# Patient Record
Sex: FEMALE | URBAN - METROPOLITAN AREA
[De-identification: names, ages, dates, MRNs, and addresses within clinical notes are randomized per-mention and may not be internally consistent; named-entity substitution may affect disease eponyms.]

---

## 2017-12-25 ENCOUNTER — HOSPITAL ENCOUNTER (EMERGENCY)
Facility: HOSPITAL | Age: 82
End: 2017-12-25
Attending: EMERGENCY MEDICINE

## 2017-12-25 VITALS
RESPIRATION RATE: 28 BRPM | OXYGEN SATURATION: 97 % | TEMPERATURE: 97 F | DIASTOLIC BLOOD PRESSURE: 56 MMHG | HEART RATE: 67 BPM | SYSTOLIC BLOOD PRESSURE: 147 MMHG

## 2017-12-25 PROCEDURE — 93005 ELECTROCARDIOGRAM TRACING: CPT

## 2017-12-25 PROCEDURE — 93010 ELECTROCARDIOGRAM REPORT: CPT | Performed by: EMERGENCY MEDICINE

## 2017-12-25 RX ORDER — AMLODIPINE BESYLATE 5 MG/1
5 TABLET ORAL DAILY
COMMUNITY
End: 2017-12-25

## 2017-12-25 RX ORDER — ASPIRIN 325 MG
325 TABLET, DELAYED RELEASE (ENTERIC COATED) ORAL EVERY 6 HOURS PRN
COMMUNITY
End: 2017-12-25

## 2017-12-25 RX ORDER — METOPROLOL SUCCINATE 50 MG/1
50 TABLET, EXTENDED RELEASE ORAL DAILY
COMMUNITY
End: 2017-12-25

## 2017-12-25 RX ORDER — LISINOPRIL 2.5 MG/1
2.5 TABLET ORAL DAILY
COMMUNITY
End: 2017-12-25

## 2017-12-25 RX ORDER — SIMVASTATIN 40 MG
40 TABLET ORAL NIGHTLY
COMMUNITY
End: 2017-12-25

## 2017-12-25 NOTE — ED NOTES
Patient here after being with family. Family states she became very tired, and said she felt short of breath. Family says patient looked very tired, and just wanted to go to bed.

## 2017-12-25 NOTE — ED INITIAL ASSESSMENT (HPI)
Patient at family gathering when she began short of breath and tired. Answered medics questions appropriately. Alert and oriented x's 4. Patient states she is very tired.